# Patient Record
(demographics unavailable — no encounter records)

---

## 2024-10-15 NOTE — PHYSICAL EXAM
[Well Developed] : well developed [Well Nourished] : well nourished [No Acute Distress] : no acute distress [Normal Conjunctiva] : normal conjunctiva [Normal Venous Pressure] : normal venous pressure [No Carotid Bruit] : no carotid bruit [Normal S1, S2] : normal S1, S2 [No Murmur] : no murmur [No Rub] : no rub [No Gallop] : no gallop [Irregularly Irregular] : irregularly irregular [Normal S1] : normal S1 [Normal S2] : normal S2 [II] : a grade 2 [No Pitting Edema] : no pitting edema present [No Abnormalities] : the abdominal aorta was not enlarged and no bruit was heard [Clear Lung Fields] : clear lung fields [Good Air Entry] : good air entry [No Respiratory Distress] : no respiratory distress  [Soft] : abdomen soft [Non Tender] : non-tender [No Masses/organomegaly] : no masses/organomegaly [Normal Bowel Sounds] : normal bowel sounds [Normal Gait] : normal gait [No Edema] : no edema [No Cyanosis] : no cyanosis [No Clubbing] : no clubbing [No Varicosities] : no varicosities [No Rash] : no rash [No Skin Lesions] : no skin lesions [Moves all extremities] : moves all extremities [No Focal Deficits] : no focal deficits [Normal Speech] : normal speech [Alert and Oriented] : alert and oriented [Normal memory] : normal memory [Rt] : no varicose veins of the right leg [Lt] : no varicose veins of the left leg [Right Carotid Bruit] : no bruit heard over the right carotid [Left Carotid Bruit] : no bruit heard over the left carotid

## 2024-10-15 NOTE — DISCUSSION/SUMMARY
[With Me] : with me [___ Month(s)] : in [unfilled] month(s) [FreeTextEntry1] : Kitty is an 85 year old female here for follow up. In 2019, she was found to have a PE, and new atrial fibrillation. S/P PPM  in 2021 for slow atrial fibrillation.  Overall, she has been doing well, without worrisome symptoms.  Echocardiograms continue to demonstrate moderate MR/TR, normal LV function, and mild to moderately elevated pulmonary pressures, last noted in 2024.   Her BP is better overall.  She will remain on irbesartan 300, and off of amlodipine.  Stress test suggested no ischemia in 2024. She will continue her Eliquis 5 bid for systemic a/c.  I have requested a copy of her BW. She reported mild ROSITA (and was told to see a nephrologist). She is currently on torsemide 40 bid.  If no issues, I will see her again in 6 months. [EKG obtained to assist in diagnosis and management of assessed problem(s)] : EKG obtained to assist in diagnosis and management of assessed problem(s)

## 2024-10-15 NOTE — HISTORY OF PRESENT ILLNESS
[FreeTextEntry1] : Kitty is an 85 year old female here for follow up.  She has a history of HTN, HLD and breast ca s/p mastectomy. She was admitted on 7/2/2019 with exertional shortness of breath. A CTPA was notable for a RLL PE. Posterior tibial dvt on left was noted. She was also noted to have new rate controlled atrial fibrillation. She was treated with Eliquis and cardizem and eventually sent home. Echocardiogram with normal LV function, though moderately elevated pulmonary pressures. She is now s/p PPM for bradycardia in 5/21. She has no chest pain or palpitations. Her breathing is at baseline. She no longer has significant fatigue and is better overall. Pharm stress without ischemia (possible anterior infarct) in 2021. She is no longer on Norvasc because she felt dyspnea when on it.  Her edema has completely resolved, on torsemide.  Blood work with a BNP of 1791 and mild hypernatremia.CT showed a diffuse inflammatory bronchopulmonary process. Echocardiogram with mild-mod MR, normal LV function, mild LVH, moderately elevated pulmonary pressures (56 mm hg) and severe LAE. Repeat echo in 2/22 with mild to mod MR, severely dilated LA, normal LV function, normal RV size with decreased RV function, with mild-mod TR and mild pulmonary pressures.   I last saw her in 4/23.  She presents today for routine follow up.  Her  passed away in 12/23.  She is under less stress, and is actually getting some sleep.  She was short of breath in 6/24, and had a pharm stress which showed no ischemia. These symptoms have resolved. She denies chest pains or pressure. She denies dizzy spells, feeling faint or fainting. She denies palpitations or difficulty breathing while laying flat. She denies lower extremity swelling.   Echocardiogram in April 2023, demonstrated normal LV function, anterior leaflet mitral valve prolapse, with moderate mitral regurgitation, moderate TR, moderately elevated pulmonary pressures.  A repeat study in April 2024 demonstrated normal LV function, with severe left atrial enlargement, anterior mitral valve prolapse, with at least moderate MR, moderate TR, and improved mildly elevated pulmonary pressures. She is now on torsemide 40 bid.

## 2025-04-01 NOTE — CARDIOLOGY SUMMARY
[de-identified] : 4/14/23: tawnyib  sensed [de-identified] : pharm 2021\par  mod defect ant wall that is fixed> infarct  [de-identified] : 2/2022\par  EF 60%\par  NML LV\par  dec RV systolic, mild LANA\par  mild PHTN\par  mild -mod TR\par  mild -mod MR

## 2025-04-01 NOTE — DISCUSSION/SUMMARY
[With Me] : with me [___ Month(s)] : in [unfilled] month(s) [FreeTextEntry1] : Kitty is an 86 year old female here for follow up. In 2019, she was found to have a PE, and new atrial fibrillation. S/P PPM  in 2021 for slow atrial fibrillation.  Overall, she has been doing well, without worrisome symptoms.  Echocardiograms continue to demonstrate moderate MR/TR, normal LV function, and mild to moderately elevated pulmonary pressures, last noted in 2024. We will repeat a study this year.  Her BP is acceptable on repeat evaluation.  She will remain on irbesartan 300, and off of amlodipine.  Stress test suggested no ischemia in 2024. She will continue her Eliquis 5 bid for systemic a/c.  I have requested a copy of her BW to see where renal function. She is currently on torsemide 40 bid. She will have BW for a CMP and BNP on the day of her echocardiogram.  If no issues, I will see her again in 6 months. [EKG obtained to assist in diagnosis and management of assessed problem(s)] : EKG obtained to assist in diagnosis and management of assessed problem(s)

## 2025-04-01 NOTE — HISTORY OF PRESENT ILLNESS
[FreeTextEntry1] : Kitty is an 86 year old female here for follow up.  She has a history of HTN, HLD and breast ca s/p mastectomy. She was admitted on 7/2/2019 with exertional shortness of breath. A CTPA was notable for a RLL PE. Posterior tibial dvt on left was noted. She was also noted to have new rate controlled atrial fibrillation. She was treated with Eliquis and cardizem and eventually sent home. Echocardiogram with normal LV function, though moderately elevated pulmonary pressures. She is now s/p PPM for bradycardia in 5/21. She has no chest pain or palpitations. Her breathing is at baseline. She no longer has significant fatigue and is better overall. Pharm stress without ischemia (possible anterior infarct) in 2021. She is no longer on Norvasc because she felt dyspnea when on it.  Her edema has completely resolved, on torsemide.  Blood work with a BNP of 1791 and mild hypernatremia.CT showed a diffuse inflammatory bronchopulmonary process. Echocardiogram with mild-mod MR, normal LV function, mild LVH, moderately elevated pulmonary pressures (56 mm hg) and severe LAE. Repeat echo in 2/22 with mild to mod MR, severely dilated LA, normal LV function, normal RV size with decreased RV function, with mild-mod TR and mild pulmonary pressures.   I last saw her in 10/24.  She presents today for routine follow up.  Her  passed away in 12/23.  She is under less stress, and is actually getting some sleep.  She was short of breath in 6/24, and had a pharm stress which showed no ischemia. These symptoms have resolved. She denies chest pains or pressure. She denies dizzy spells, feeling faint or fainting. She denies palpitations or difficulty breathing while laying flat. She denies lower extremity swelling.    Echocardiogram in April 2023, demonstrated normal LV function, anterior leaflet mitral valve prolapse, with moderate mitral regurgitation, moderate TR, moderately elevated pulmonary pressures.  A repeat study in April 2024 demonstrated normal LV function, with severe left atrial enlargement, anterior mitral valve prolapse, with at least moderate MR, moderate TR, and improved mildly elevated pulmonary pressures. She is now on torsemide 40 bid.

## 2025-05-22 NOTE — HISTORY OF PRESENT ILLNESS
[Former] : former [< 20 pack-years] : < 20 pack-years [Never] : never [TextBox_4] :  MAUREEN MARIA 86 year-female NEW PATIENT VISIT seen in clinic for Pulmonary Evaluation of worsening CORTEZ and Pulmonary Fibrosis diagnosed in 2020 after hospitalization. Patient is accompanied by her granddaughter, Denice. She has not been treated with any medications for pulmonary issues since 2020. She has felt ill recently, started to cough, chills but no fever for a few weeks. She states she sats in 70's while sleeping and as per Denice. She cannot talk without becoming breathless.          *SMOKING PMHx: quit 50 years ago, 1/2 ppd x 15 yrs = 7.5 pack years        * PAST CHRONIC PULMONARY Hx: Hospitalized x 1 for pneumonia, started on oxygen in 2020, but discontinued.       * PAST TREATMENTS & INHALERS (Date & Response):        * EKG/IMAGING Results: Chest CT 2/25/2023 shows b/l pulmonary nodules, fibrosis/scarring seen in 2020, new groundglass opacity in RML, and calcified granuloma LLL. Rec'd f/u in 10-12 week f/u but never done or reviewed with patient.        * SNORING Hx: None  6 minute walk test with patient caused drop to below 80% on room air after 2 minutes.  [TextBox_11] : 1/2 [TextBox_13] : 15 [YearQuit] : 1975 [TextBox_29] : 7.5 pack years

## 2025-05-22 NOTE — PHYSICAL EXAM
[Nasal congestion] : nasal congestion [Turbinate hypertrophy] : turbinate hypertrophy [Normal Appearance] : normal appearance [No Neck Mass] : no neck mass [Normal Rate/Rhythm] : normal rate/rhythm [Normal S1, S2] : normal s1, s2 [No Murmurs] : no murmurs [Benign] : benign [Normal Gait] : normal gait [No Edema] : no edema [FROM] : FROM [No Focal Deficits] : no focal deficits [Oriented x3] : oriented x3 [Normal Affect] : normal affect [TextBox_2] : Labored breathing, breathless while talking [TextBox_68] : Mild accessory muscle use, shallow breathing, b/l lower reyna inspritory crackles and faint upper airway wheezing.

## 2025-05-22 NOTE — CONSULT LETTER
[Dear  ___] : Dear  [unfilled], [Courtesy Letter:] : I had the pleasure of seeing your patient, [unfilled], in my office today. [Please see my note below.] : Please see my note below. [Consult Closing:] : Thank you very much for allowing me to participate in the care of this patient.  If you have any questions, please do not hesitate to contact me. [Sincerely,] : Sincerely, [FreeTextEntry3] : VIJAY Burks, RPA-C  [facility] [place]

## 2025-05-22 NOTE — REVIEW OF SYSTEMS
[Fatigue] : fatigue [Chills] : chills [Nasal Congestion] : nasal congestion [Sinus Problems] : sinus problems [Cough] : cough [Chest Tightness] : chest tightness [Sputum] : sputum [Dyspnea] : dyspnea [Wheezing] : wheezing [SOB on Exertion] : sob on exertion [Chest Discomfort] : chest discomfort [Seasonal Allergies] : seasonal allergies [Nasal Discharge] : nasal discharge [Negative] : Psychiatric [Fever] : no fever [Postnasal Drip] : no postnasal drip [Palpitations] : no palpitations None known

## 2025-05-22 NOTE — CONSULT LETTER
[Dear  ___] : Dear  [unfilled], [Courtesy Letter:] : I had the pleasure of seeing your patient, [unfilled], in my office today. [Please see my note below.] : Please see my note below. [Consult Closing:] : Thank you very much for allowing me to participate in the care of this patient.  If you have any questions, please do not hesitate to contact me. [Sincerely,] : Sincerely, [FreeTextEntry3] : VIJAY Burks, RPA-C  [facility] [place]

## 2025-05-22 NOTE — END OF VISIT
[TextEntry] : Total PA time spent on this encounter was 68 minutes. This includes time devoted to preparing to see the patient with review of previous medical record, obtaining medical history, performing physical exam and walk test, counseling and patient education with patient and family, ordering medications and lab studies, documentation in the medical record and coordination of care.

## 2025-05-22 NOTE — REVIEW OF SYSTEMS
[Fatigue] : fatigue [Chills] : chills [Nasal Congestion] : nasal congestion [Sinus Problems] : sinus problems [Cough] : cough [Chest Tightness] : chest tightness [Sputum] : sputum [Dyspnea] : dyspnea [Wheezing] : wheezing [SOB on Exertion] : sob on exertion [Chest Discomfort] : chest discomfort [Seasonal Allergies] : seasonal allergies [Nasal Discharge] : nasal discharge [Negative] : Psychiatric [Fever] : no fever [Postnasal Drip] : no postnasal drip [Palpitations] : no palpitations

## 2025-05-22 NOTE — DISCUSSION/SUMMARY
[FreeTextEntry1] : 1. Acute Exacerbation of COPD with Hypoxia--> Patient to start Breyna 160, 2 puffs twice daily as maintenance and albuterol inhaler as rescue for triggers/symptoms. Patient advised to use albuterol prior to known triggers. Will start Zpak and Prednisone 20 mg daily for 10 days for acute exacerbation and to cover any bronchitis symptoms due to illness. Patient advised to go to ED if symptoms worsen 2. Pulmonary Fibrosis/ILD---> Patient to start Sidenifil and Ofev for progressive lung disease with unknown etiology but possible smoking history. Will order f/u Chest CT and supplemental oxygen from [employer] for 24 hour use. 3. Chronic Rhinitis---> Patient to start Azelastine nasal spray as directed. 4. Patient to RTC in 3 months for Chest CT results, Supplemental O2 f/u, PFT anf symptom reevaluation. Patient encounter conducted as part of ongoing, longitudinal medical care for patient's medical and other issues.

## 2025-05-22 NOTE — REASON FOR VISIT
[Initial] : an initial visit [Family Member] : family member [Other: _____] : [unfilled] [TextBox_44] : New patient for pulmonary evaluation/SOB

## 2025-06-23 NOTE — HISTORY OF PRESENT ILLNESS
[Former] : former [Never] : never [TextBox_4] : 86-year-old female hospitalized for 4 days due to COPD exacerbation.  She had a CT of the chest which showed increasing groundglass abnormality in the right middle lobe as well as multiple other nodules.  She was unable to obtain Ofev.  She is using oxygen 24 hours a day now.  She has some nasal discharge but her breathing has improved.  She is on Breyna.  She finished Zithromax and steroids.

## 2025-06-23 NOTE — REASON FOR VISIT
[Follow-Up - From Hospitalization] : a follow-up visit after a recent hospitalization [TextBox_44] : Pulmonary evaluation

## 2025-06-23 NOTE — PHYSICAL EXAM
[No Acute Distress] : no acute distress [Normal Oropharynx] : normal oropharynx [Normal Appearance] : normal appearance [No Neck Mass] : no neck mass [Normal Rate/Rhythm] : normal rate/rhythm [Normal S1, S2] : normal s1, s2 [No Murmurs] : no murmurs [No Resp Distress] : no resp distress [No Acc Muscle Use] : no acc muscle use [Normal Palpation] : normal palpation [Clear to Auscultation Bilaterally] : clear to auscultation bilaterally [Normal to Percussion] : normal to percussion [No Abnormalities] : no abnormalities [Benign] : benign [Not Tender] : not tender [No Masses] : no masses [Soft] : soft [Normal Bowel Sounds] : normal bowel sounds [No Clubbing] : no clubbing [No Cyanosis] : no cyanosis [No Edema] : no edema [Normal Color/ Pigmentation] : normal color/ pigmentation [Normal Affect] : normal affect [TextBox_2] : warren female [TextBox_68] : decreased bs

## 2025-06-23 NOTE — DISCUSSION/SUMMARY
[FreeTextEntry1] : Kitty was seen in the presence of her family.  She had an exacerbation of COPD and was hospitalized for several days.  Her breathing is better now but she is oxygen dependent.  She has underlying COPD but likely has some degree of pulmonary fibrosis.  She also has an enlarged groundglass right middle lobe nodule and has been advised to repeat CT of chest in November.  Would consider Ofev trial.  She will be seeing Dr. Maloney next week.  Continue oxygen nightly.  Discussed with family.  Return 3 mos and PFT.  Respiratory muscle  advised.    Time spent reviewing file, taking history , and examining patient: ____41______ minutes

## 2025-06-23 NOTE — CONSULT LETTER
[Dear  ___] : Dear  [unfilled], [Consult Letter:] : I had the pleasure of evaluating your patient, [unfilled]. [Please see my note below.] : Please see my note below. [Consult Closing:] : Thank you very much for allowing me to participate in the care of this patient.  If you have any questions, please do not hesitate to contact me. [Sincerely,] : Sincerely, [FreeTextEntry2] : Quentin Sheikh [FreeTextEntry3] :  Dylan Blanchard MD FACP FCCP Pulmonary Diseases and Critical Care Medicine. Chief, Pulmonary Diseases, [facility]

## 2025-06-30 NOTE — HISTORY OF PRESENT ILLNESS
[FreeTextEntry1] : Kitty is an 86 year old female here for follow up.  She has a history of HTN, HLD and breast ca s/p mastectomy. She was admitted on 7/2/2019 with exertional shortness of breath. A CTPA was notable for a RLL PE. Posterior tibial dvt on left was noted. She was also noted to have new rate controlled atrial fibrillation. She was treated with Eliquis and cardizem and eventually sent home. Echocardiogram with normal LV function, though moderately elevated pulmonary pressures. She is now s/p PPM for bradycardia in 5/21. She has no chest pain or palpitations. Her breathing is at baseline. She no longer has significant fatigue and is better overall. Pharm stress without ischemia (possible anterior infarct) in 2021. She is no longer on Norvasc because she felt dyspnea when on it.  Her edema has completely resolved, on torsemide.  Blood work with a BNP of 1791 and mild hypernatremia.CT showed a diffuse inflammatory bronchopulmonary process. Echocardiogram with mild-mod MR, normal LV function, mild LVH, moderately elevated pulmonary pressures (56 mm hg) and severe LAE. Repeat echo in 2/22 with mild to mod MR, severely dilated LA, normal LV function, normal RV size with decreased RV function, with mild-mod TR and mild pulmonary pressures.   Echocardiogram in April 2023, demonstrated normal LV function, anterior leaflet mitral valve prolapse, with moderate mitral regurgitation, moderate TR, moderately elevated pulmonary pressures.  A repeat study in April 2024 demonstrated normal LV function, with severe left atrial enlargement, anterior mitral valve prolapse, with at least moderate MR, moderate TR, and improved mildly elevated pulmonary pressures. She was short of breath in 6/24, and had a pharm stress which showed no ischemia. Her  passed away in 12/23.   I last saw her in 4/25.  She presents today for routine follow up.   She was admitted with hypoxic respiratory failure with a COPD exacerbation in June 2025.  She was treated with intravenous steroids, nebulizers, and antibiotics, and eventually discharged about 4 days later.  She last saw her pulmonologist on June 23.  A CT chest showed ground glass abnormalities in the right middle lobe, with suspicion of pulmonary fibrosis.  She is now using oxygen 24 hours a day, now on 2L. She is now taking torsemide 40 bid. She reports worsening shortness of breath overall. Echo in 2025 with moderate MR/anterior prolapse, moderate TR, and pasp 57

## 2025-06-30 NOTE — CARDIOLOGY SUMMARY
[de-identified] : 4/14/23: tawnyib  sensed [de-identified] : pharm 2021\par  mod defect ant wall that is fixed> infarct  [de-identified] : 2/2022\par  EF 60%\par  NML LV\par  dec RV systolic, mild LANA\par  mild PHTN\par  mild -mod TR\par  mild -mod MR

## 2025-06-30 NOTE — DISCUSSION/SUMMARY
[With Me] : with me [___ Month(s)] : in [unfilled] month(s) [FreeTextEntry1] : Kitty is an 86 year old female here for follow up. In 2019, she was found to have a PE, and new atrial fibrillation. S/P PPM  in 2021 for slow atrial fibrillation.  She was recently admitted with a COPD exacerbation, though has declined over the last week.  She is more short of breath, on minimal exertion.  Her lung sounds are decreased bilaterally, without wheezing.  I spoke to her pulmonologist during the visit, and we will increase her oxygen to 3 L/min.  I have given her a prescription for prednisone 20 mg, which she will take daily for the next week.  She will follow-up with her pulmonologist.  She does not appear volume overloaded on exam, and will remain on torsemide.  Her last echocardiogram noted normal LV function, with moderate MR and TR, and moderately elevated pulmonary pressures.  She will remain on irbesartan 300, and off of amlodipine.  Stress test suggested no ischemia in 2024. She will continue her Eliquis 5 bid for systemic a/c.  We will speak in a few weeks to see how she is doing.  If her breathing worsens, she is aware that she needs to go to the emergency room. [EKG obtained to assist in diagnosis and management of assessed problem(s)] : EKG obtained to assist in diagnosis and management of assessed problem(s)

## 2025-07-08 NOTE — HISTORY OF PRESENT ILLNESS
[TextBox_4] : 86 year old female who presents for evaluation of dyspnea. She reports that about 5 years ago was hospitalized at [facility] for presumed COVID-eventually determined not to be that.  At that time she was treated for bilateral pneumonia.  She was on oxygen during that hospitalization but that was discontinued upon discharge.  She was told to follow-up with Dr. Blanchard after hospitalization but did not do so as she was feeling well.  She reports since March has had progressive dyspnea on exertion that has been intermittent but progressive since then.  Will do small activities like putting away the dishes and will develop shortness of breath.  She in fact changed her dishes to a less heavier wand so that she would be able to maneuver better.  She was hospitalized in June-at that time treated for COPD exacerbation and discharged on home oxygen which was new for her as well as Medrol Dosepak.  About 1 to 2 weeks after discharge though she started to feel worsening again-in the past 2 days resume prednisone at 20 mg perhaps with some improvement.  She denies any lower extremity edema.  She is a former smoker, quit 50 years ago. 1.5 PPD x 20 years. 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 Denies any environmental exposures. Had a dog over 20 years ago.  No pets currently in the home no birds ever. Has been in the same home for 26 years  Pipe breakage in the attic 2 years, immediately taken care of.  No carpets. She denies any joint pains. There is no Raynaud's. There is no morning stiffness No proximal weakness No sicca symptoms except a little bit while wearing her oxygen.

## 2025-07-08 NOTE — HISTORY OF PRESENT ILLNESS
[TextBox_4] : 86 year old female who presents for evaluation of dyspnea. She reports that about 5 years ago was hospitalized at [facility] for presumed COVID-eventually determined not to be that.  At that time she was treated for bilateral pneumonia.  She was on oxygen during that hospitalization but that was discontinued upon discharge.  She was told to follow-up with Dr. Blanchard after hospitalization but did not do so as she was feeling well.  She reports since March has had progressive dyspnea on exertion that has been intermittent but progressive since then.  Will do small activities like putting away the dishes and will develop shortness of breath.  She in fact changed her dishes to a less heavier wand so that she would be able to maneuver better.  She was hospitalized in June-at that time treated for COPD exacerbation and discharged on home oxygen which was new for her as well as Medrol Dosepak.  About 1 to 2 weeks after discharge though she started to feel worsening again-in the past 2 days resume prednisone at 20 mg perhaps with some improvement.  She denies any lower extremity edema.  She is a former smoker, quit 50 years ago. 1.5 PPD x 20 years. 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 Denies any environmental exposures. Had a dog over 20 years ago.  No pets currently in the home no birds ever. Has been in the same home for 26 years  Pipe breakage in the attic 2 years, immediately taken care of.  No carpets. She denies any joint pains. There is no Raynaud's. There is no morning stiffness No proximal weakness No sicca symptoms except a little bit while wearing her oxygen.

## 2025-07-08 NOTE — PHYSICAL EXAM
[No Acute Distress] : no acute distress [No Deformities] : no deformities [TextBox_2] : chronically ill appearing

## 2025-07-08 NOTE — ASSESSMENT
[FreeTextEntry1] : Ms. Pruitt is an 86-year-old female who presents for evaluation of shortness of breath.  The etiology of her symptoms and hypoxia remains elusive at this time though it does not seem to be attributable to pulmonary fibrosis given very minimal findings seen on CT scan there is some mild traction bronchiectasis in the right middle lobe and some fibrosis particular around the heart which is based on the degree of cardiomegaly.  It seems that patient's symptoms started largely this year with only change on evaluation being the development of pulmonary hypertension.  I suspect that this is explaining in part her exercise intolerance and hypoxemia.  I suspect this is largely going to be group 2 with some contribution from group 3 (?copd) and there may not be anything that can be done as she is euvolemic on exam but at least provide an answer to her symptomatology if confirmed on right heart catheterization.  She has a remote smoking history that this still may be contributing to her current presentation.  She had distant breath sounds on exam and improved with initiation of prednisone.  For this reason extend therapy to 40 mg x 5 days to see if there would be improvement.  Given lack of progression of fibrotic changes Ofev would not be of benefit to the patient at this time.

## 2025-07-29 NOTE — CONSULT LETTER
[Dear  ___] : Dear  [unfilled], [Consult Letter:] : I had the pleasure of evaluating your patient, [unfilled]. [Please see my note below.] : Please see my note below. [Consult Closing:] : Thank you very much for allowing me to participate in the care of this patient.  If you have any questions, please do not hesitate to contact me. [Sincerely,] : Sincerely, [FreeTextEntry2] : Quentin Sheikh [FreeTextEntry3] :  Dylan Blanchard MD FACP FCCP Pulmonary Diseases and Critical Care Medicine. Chief, Pulmonary Diseases, [facility]

## 2025-07-29 NOTE — DISCUSSION/SUMMARY
[FreeTextEntry1] : Kitty likely has COPD as well as some degree of CHF that caused her symptoms to worsen.  She apparently did improve with prednisone.  Doubt significant pulmonary fibrosis at this time.  Will obtain home sleep study and assess whether she is desaturating at night.  She may use oxygen as needed during the day.  Continue Breyna and Spiriva.  Discussed with son in detail.  PFT showed severe obstructive and restrictive impairment with markedly decreased diffusion capacity.  I will reevaluate her in 4 months.    Time spent reviewing file, taking history , and examining patient: ___44_______ minutes

## 2025-07-29 NOTE — PHYSICAL EXAM
[No Acute Distress] : no acute distress [Normal Oropharynx] : normal oropharynx [Normal Appearance] : normal appearance [Thyroid Not Enlarged] : thyroid not enlarged [No Neck Mass] : no neck mass [Normal Rate/Rhythm] : normal rate/rhythm [Normal Pulses] : normal pulses [Normal S1, S2] : normal s1, s2 [No Murmurs] : no murmurs [No Rubs] : no rubs [No Gallops] : no gallops [No Resp Distress] : no resp distress [No Acc Muscle Use] : no acc muscle use [Normal Palpation] : normal palpation [Clear to Auscultation Bilaterally] : clear to auscultation bilaterally [Normal to Percussion] : normal to percussion [No Abnormalities] : no abnormalities [Benign] : benign [Not Tender] : not tender [Soft] : soft [No Clubbing] : no clubbing [No Cyanosis] : no cyanosis [No Edema] : no edema [Normal Color/ Pigmentation] : normal color/ pigmentation [Normal Affect] : normal affect [TextBox_2] : elderly female

## 2025-07-29 NOTE — HISTORY OF PRESENT ILLNESS
[TextBox_4] : 86-year-old female with COPD has markedly improved since she started prednisone which she has completed.  She is not using oxygen now.  She saw Dr. SIMEON.  She has no wheezing or cough now.  She denies dyspnea.  She claims that she had significant nasal symptoms which improved with azelastine and helped her breathing generally.